# Patient Record
Sex: FEMALE | Race: WHITE | NOT HISPANIC OR LATINO | Employment: STUDENT | URBAN - METROPOLITAN AREA
[De-identification: names, ages, dates, MRNs, and addresses within clinical notes are randomized per-mention and may not be internally consistent; named-entity substitution may affect disease eponyms.]

---
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
## 2024-04-30 ENCOUNTER — OFFICE VISIT (OUTPATIENT)
Dept: URGENT CARE | Facility: CLINIC | Age: 19
End: 2024-04-30
Payer: COMMERCIAL

## 2024-04-30 VITALS
HEART RATE: 97 BPM | TEMPERATURE: 99.3 F | OXYGEN SATURATION: 98 % | DIASTOLIC BLOOD PRESSURE: 72 MMHG | SYSTOLIC BLOOD PRESSURE: 120 MMHG | RESPIRATION RATE: 18 BRPM

## 2024-04-30 DIAGNOSIS — J06.9 VIRAL URI WITH COUGH: Primary | ICD-10-CM

## 2024-04-30 PROCEDURE — 99213 OFFICE O/P EST LOW 20 MIN: CPT | Performed by: PHYSICIAN ASSISTANT

## 2024-04-30 NOTE — PATIENT INSTRUCTIONS
Discussed symptoms are most likely viral in nature.  Recommend continuing supportive care.  Work note provided.

## 2024-04-30 NOTE — PROGRESS NOTES
[facility]        NAME: Francy Fonseca is a 19 y.o. female  : 2005    MRN: [id number]  DATE: 2024  TIME: 4:54 PM    Assessment and Plan   Viral URI with cough [J06.9]  1. Viral URI with cough              Patient Instructions     Patient Instructions   Discussed symptoms are most likely viral in nature.  Recommend continuing supportive care.  Work note provided.      Follow up with PCP in 3-5 days.  Proceed to  ER if symptoms worsen.    Chief Complaint     Chief Complaint   Patient presents with    Cold Like Symptoms     Sore throat, congestion x 1 week along with a cough          History of Present Illness       Patient is a 18-year-old female presenting today with cold-like symptoms x 3 days.  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  Is needing a work note.  Denies fever, chills, chest tightness, SOB.        Review of Systems   Review of Systems   Constitutional:  Negative for chills, fatigue and fever.   HENT:  Positive for congestion, postnasal drip and sore throat.    Eyes:  Negative for redness and itching.   Respiratory:  Positive for cough. Negative for chest tightness and shortness of breath.    Cardiovascular:  Negative for chest pain.   Gastrointestinal:  Negative for diarrhea, nausea and vomiting.   Musculoskeletal:  Negative for arthralgias and myalgias.   Neurological:  Negative for light-headedness and headaches.         Current Medications       Current Outpatient Medications:     albuterol (PROVENTIL HFA,VENTOLIN HFA) 90 mcg/act inhaler, inhale 2 puffs every 4 hours if needed for wheezing or shortness of breath, Disp: 8.5 g, Rfl: 3    montelukast (SINGULAIR) 10 mg tablet, take 1 tablet by mouth daily at bedtime  (Patient not taking: Reported on 4/30/2024), Disp: 30 tablet, Rfl: 3    Xulane 150-35 MCG/24HR, Place 1 patch on the skin once a week (Patient not taking: Reported on 4/30/2024), Disp: , Rfl:     Current Allergies     Allergies as of 04/30/2024    (No Known Allergies)            The following portions of the patient's history were reviewed and updated as appropriate: allergies, current medications, past family history, past medical history, past social history, past surgical history and problem list.     History reviewed. No pertinent past medical history.    Past Surgical History:   Procedure Laterality Date    TONSILECTOMY AND ADNOIDECTOMY         Family History   Problem Relation Age of Onset    Hypertension Father     Hyperlipidemia Father          Medications have been verified.        Objective   /72   Pulse 97   Temp 99.3 °F (37.4 °C)   Resp 18   SpO2 98%        Physical Exam     Physical Exam  Vitals and nursing note reviewed.   Constitutional:       General: She is not in acute distress.     Appearance: Normal appearance. She is not ill-appearing.   HENT:      Head: Normocephalic.      Right Ear: Tympanic membrane, ear canal and external ear normal.      Left Ear: Tympanic membrane, ear canal and external ear normal.      Nose: Congestion present.      Mouth/Throat:      Mouth: Mucous membranes are moist.      Pharynx: Oropharynx is clear. No oropharyngeal exudate or posterior oropharyngeal erythema.   Eyes:      Conjunctiva/sclera: Conjunctivae normal.   Cardiovascular:      Rate and Rhythm: Normal rate and regular rhythm.      Pulses: Normal pulses.      Heart sounds: Normal heart sounds.   Pulmonary:      Effort: Pulmonary effort is normal.      Breath sounds: Normal breath sounds.   Lymphadenopathy:      Cervical: No cervical adenopathy.   Skin:     General: Skin is warm.      Capillary Refill: Capillary refill takes less than 2 seconds.   Neurological:      Mental Status: She is alert.

## 2024-04-30 NOTE — LETTER
April 30, 2024     Patient: Francy Fonseca   YOB: 2005   Date of Visit: 4/30/2024       To Whom It May Concern:    It is my medical opinion that Francy Fonseca may return to work on 05/03/2024 .    If you have any questions or concerns, please don't hesitate to call.         Sincerely,        Florentin Dumont PA-C    CC: No Recipients

## 2024-05-16 ENCOUNTER — TELEPHONE (OUTPATIENT)
Dept: FAMILY MEDICINE CLINIC | Facility: CLINIC | Age: 19
End: 2024-05-16

## 2024-05-28 ENCOUNTER — OFFICE VISIT (OUTPATIENT)
Dept: URGENT CARE | Facility: CLINIC | Age: 19
End: 2024-05-28
Payer: COMMERCIAL

## 2024-05-28 VITALS
RESPIRATION RATE: 18 BRPM | DIASTOLIC BLOOD PRESSURE: 68 MMHG | BODY MASS INDEX: 20.76 KG/M2 | WEIGHT: 124.6 LBS | OXYGEN SATURATION: 100 % | TEMPERATURE: 99.1 F | HEART RATE: 113 BPM | SYSTOLIC BLOOD PRESSURE: 110 MMHG | HEIGHT: 65 IN

## 2024-05-28 DIAGNOSIS — H66.003 NON-RECURRENT ACUTE SUPPURATIVE OTITIS MEDIA OF BOTH EARS WITHOUT SPONTANEOUS RUPTURE OF TYMPANIC MEMBRANES: Primary | ICD-10-CM

## 2024-05-28 DIAGNOSIS — J02.9 ACUTE VIRAL PHARYNGITIS: ICD-10-CM

## 2024-05-28 LAB — S PYO AG THROAT QL: NEGATIVE

## 2024-05-28 PROCEDURE — 87880 STREP A ASSAY W/OPTIC: CPT

## 2024-05-28 PROCEDURE — 99213 OFFICE O/P EST LOW 20 MIN: CPT

## 2024-05-28 RX ORDER — AMOXICILLIN 875 MG/1
875 TABLET, COATED ORAL 2 TIMES DAILY
Qty: 14 TABLET | Refills: 0 | Status: SHIPPED | OUTPATIENT
Start: 2024-05-28 | End: 2024-06-04

## 2024-05-28 NOTE — PATIENT INSTRUCTIONS
I have prescribed an antibiotic for the infection.  Please take the antibiotic as prescribed and finish the entire prescription.  Take an over the counter probiotic or eat yogurt with live cultures in it (activia) to keep good bacteria in the gut and help prevent diarrhea.      You may take over the counter Tylenol (Acetaminophen) and/or Motrin (Ibuprofen) as needed, as directed on packaging.      If your symptoms worsen or you develop drainage from the ear or worsening pain, please proceed to the emergency room.

## 2024-05-28 NOTE — PROGRESS NOTES
[facility] Now        NAME: Francy Fonseca is a 19 y.o. female  : 2005    MRN: [id number]  DATE: May 28, 2024  TIME: 6:09 PM    Assessment and Plan   Non-recurrent acute suppurative otitis media of both ears without spontaneous rupture of tympanic membranes [H66.003]  1. Non-recurrent acute suppurative otitis media of both ears without spontaneous rupture of tympanic membranes  amoxicillin (AMOXIL) 875 mg tablet      2. Acute viral pharyngitis  POCT rapid ANTIGEN strepA            Patient Instructions     I have prescribed an antibiotic for the infection.  Please take the antibiotic as prescribed and finish the entire prescription.  Take an over the counter probiotic or eat yogurt with live cultures in it (activia) to keep good bacteria in the gut and help prevent diarrhea.      You may take over the counter Tylenol (Acetaminophen) and/or Motrin (Ibuprofen) as needed, as directed on packaging.      If your symptoms worsen or you develop drainage from the ear or worsening pain, please proceed to the emergency room.    Follow up with PCP in 3-5 days.  Proceed to  ER if symptoms worsen.    If tests are performed, our office will contact you with results only if changes need to made to the care plan discussed with you at the visit. You can review your full results on [facility].    Chief Complaint     Chief Complaint   Patient presents with    Sore Throat     Patient c/o sore throat and ear pain that started 2 days ago.          History of Present Illness       Sore throat and ear pain that began 2 days ago. Pt using OTC ibuprofen and tylenol and states it is not helping her symptoms. Denies fever or chills. + fatigue. Denies appetite change. Reports voice hoarse. + productive cough for clear sputum.         Review of Systems   Review of Systems   Constitutional:  Positive for fatigue. Negative for activity change, appetite change, chills and fever.   HENT:  Positive for ear pain and sore throat.  "Negative for congestion, ear discharge, postnasal drip, rhinorrhea, sinus pressure, sinus pain, trouble swallowing and voice change.    Eyes:  Negative for pain, discharge, redness and itching.   Respiratory:  Positive for cough (productive; clear). Negative for shortness of breath.    Gastrointestinal:  Negative for abdominal pain, diarrhea, nausea and vomiting.   Musculoskeletal:  Negative for arthralgias, myalgias, neck pain and neck stiffness.   Skin:  Negative for color change.   Neurological:  Negative for headaches.         Current Medications       Current Outpatient Medications:     amoxicillin (AMOXIL) 875 mg tablet, Take 1 tablet (875 mg total) by mouth 2 (two) times a day for 7 days, Disp: 14 tablet, Rfl: 0    albuterol (PROVENTIL HFA,VENTOLIN HFA) 90 mcg/act inhaler, inhale 2 puffs every 4 hours if needed for wheezing or shortness of breath (Patient not taking: Reported on 5/28/2024), Disp: 8.5 g, Rfl: 3    montelukast (SINGULAIR) 10 mg tablet, take 1 tablet by mouth daily at bedtime (Patient not taking: Reported on 4/30/2024), Disp: 30 tablet, Rfl: 3    Xulane 150-35 MCG/24HR, Place 1 patch on the skin once a week (Patient not taking: Reported on 4/30/2024), Disp: , Rfl:     Current Allergies     Allergies as of 05/28/2024    (No Known Allergies)            The following portions of the patient's history were reviewed and updated as appropriate: allergies, current medications, past family history, past medical history, past social history, past surgical history and problem list.     History reviewed. No pertinent past medical history.    Past Surgical History:   Procedure Laterality Date    TONSILECTOMY AND ADNOIDECTOMY         Family History   Problem Relation Age of Onset    Hypertension Father     Hyperlipidemia Father          Medications have been verified.        Objective   /68   Pulse (!) 113   Temp 99.1 °F (37.3 °C) (Temporal)   Resp 18   Ht 5' 5\" (1.651 m)   Wt 56.5 kg (124 lb 9.6 " oz)   LMP 05/23/2024 (Exact Date)   SpO2 100%   BMI 20.73 kg/m²        Physical Exam     Physical Exam  Vitals and nursing note reviewed.   Constitutional:       General: She is not in acute distress.     Appearance: She is well-developed and normal weight. She is not ill-appearing or toxic-appearing.   HENT:      Head: Normocephalic and atraumatic.      Right Ear: Hearing, ear canal and external ear normal. No drainage. A middle ear effusion is present. No mastoid tenderness. Tympanic membrane is injected, erythematous and bulging.      Left Ear: Hearing, ear canal and external ear normal. No drainage. A middle ear effusion is present. No mastoid tenderness. Tympanic membrane is erythematous.      Mouth/Throat:      Mouth: Mucous membranes are moist. No oral lesions.      Pharynx: Uvula midline. Posterior oropharyngeal erythema present. No oropharyngeal exudate or uvula swelling.      Comments: Tonsils surgically absent  Eyes:      Conjunctiva/sclera: Conjunctivae normal.      Pupils: Pupils are equal, round, and reactive to light.   Cardiovascular:      Rate and Rhythm: Regular rhythm. Tachycardia present.      Heart sounds: Normal heart sounds.   Pulmonary:      Effort: Pulmonary effort is normal.      Breath sounds: Normal breath sounds.   Musculoskeletal:      Cervical back: Normal range of motion and neck supple.   Skin:     General: Skin is warm and dry.      Capillary Refill: Capillary refill takes less than 2 seconds.   Neurological:      Mental Status: She is alert.

## 2024-05-28 NOTE — LETTER
May 28, 2024     Patient: Francy Fonseca  YOB: 2005  Date of Visit: 5/28/2024      To Whom it May Concern:    Francy Fonseca is under my professional care. Francy was seen in my office on 5/28/2024. Francy may return to work on 5/30/24 .    If you have any questions or concerns, please don't hesitate to call.         Sincerely,          TESHA Cartagena        CC: No Recipients

## 2025-03-30 ENCOUNTER — APPOINTMENT (EMERGENCY)
Dept: RADIOLOGY | Facility: HOSPITAL | Age: 20
End: 2025-03-30
Payer: COMMERCIAL

## 2025-03-30 ENCOUNTER — HOSPITAL ENCOUNTER (EMERGENCY)
Facility: HOSPITAL | Age: 20
Discharge: HOME | End: 2025-03-30
Payer: COMMERCIAL

## 2025-03-30 VITALS
SYSTOLIC BLOOD PRESSURE: 122 MMHG | RESPIRATION RATE: 19 BRPM | DIASTOLIC BLOOD PRESSURE: 78 MMHG | HEART RATE: 98 BPM | TEMPERATURE: 97.9 F | OXYGEN SATURATION: 98 %
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
## 2025-03-30 PROCEDURE — 99283 EMERGENCY DEPT VISIT LOW MDM: CPT

## 2025-03-30 PROCEDURE — 90715 TDAP VACCINE 7 YRS/> IM: CPT | Performed by: PHYSICIAN ASSISTANT

## 2025-03-30 PROCEDURE — 73130 X-RAY EXAM OF HAND: CPT

## 2025-03-30 PROCEDURE — 99284 EMERGENCY DEPT VISIT MOD MDM: CPT | Performed by: PHYSICIAN ASSISTANT

## 2025-03-30 PROCEDURE — 90471 IMMUNIZATION ADMIN: CPT

## 2025-03-30 RX ADMIN — AMOXICILLIN AND CLAVULANATE POTASSIUM 1 TABLET: 875; 125 TABLET, FILM COATED ORAL at 20:19

## 2025-03-30 RX ADMIN — TETANUS TOXOID, REDUCED DIPHTHERIA TOXOID AND ACELLULAR PERTUSSIS VACCINE, ADSORBED 0.5 ML: 5; 2.5; 8; 8; 2.5 SUSPENSION INTRAMUSCULAR at 20:19

## 2025-03-31 ENCOUNTER — TELEPHONE (OUTPATIENT)
Dept: FAMILY MEDICINE CLINIC | Facility: CLINIC | Age: 20
End: 2025-03-31

## 2025-03-31 NOTE — ED PROVIDER NOTES
Time reflects when diagnosis was documented in both MDM as applicable and the Disposition within this note       Time User Action Codes Description Comment    3/30/2025  8:33 PM Salazar Roldan Add [W54.0XXA] Dog bite, initial encounter           ED Disposition       ED Disposition   Discharge    Condition   Stable    Date/Time   Sun Mar 30, 2025  8:33 PM    Comment   Francy SHON Anger discharge to home/self care.                   Assessment & Plan       Medical Decision Making  20-year-old female here for evaluation of animal bite.  See HPI for further details.  Differential diagnosis includes wound infection, laceration, puncture wound, metacarpal fracture, abscess, rabies.    Animal is up-to-date on rabies, patient was covered with Augmentin for infection x-ray negative for fracture or foreign body, wound was cared for stable for discharge home on Augmentin.    Amount and/or Complexity of Data Reviewed  Radiology: ordered and independent interpretation performed.    Risk  Prescription drug management.        ED Course as of 03/30/25 2035   Sun Mar 30, 2025   2013 SpO2: 98 %   2013 Respirations: 19   2013 Pulse: 98   2013 Temperature: 97.9 °F (36.6 °C)   2013 Blood Pressure: 122/78  Vs reviewed wnl       Medications   amoxicillin-clavulanate (AUGMENTIN) 875-125 mg per tablet 1 tablet (1 tablet Oral Given 3/30/25 2019)   tetanus-diphtheria-acellular pertussis (BOOSTRIX) IM injection 0.5 mL (0.5 mL Intramuscular Given 3/30/25 2019)       ED Risk Strat Scores                            SBIRT 20yo+      Flowsheet Row Most Recent Value   Initial Alcohol Screen: US AUDIT-C     1. How often do you have a drink containing alcohol? 0 Filed at: 03/30/2025 2006   2. How many drinks containing alcohol do you have on a typical day you are drinking?  0 Filed at: 03/30/2025 2006   3a. Male UNDER 65: How often do you have five or more drinks on one occasion? 0 Filed at: 03/30/2025 2006   3b. FEMALE Any Age, or MALE 65+: How often  do you have 4 or more drinks on one occassion? 0 Filed at: 03/30/2025 2006   Audit-C Score 0 Filed at: 03/30/2025 2006   ROSA: How many times in the past year have you...    Used an illegal drug or used a prescription medication for non-medical reasons? Never Filed at: 03/30/2025 2006                            History of Present Illness       Chief Complaint   Patient presents with    Animal Bite     Family beagle bite onto right hand. UTD on vaccinations       History reviewed. No pertinent past medical history.   Past Surgical History:   Procedure Laterality Date    TONSILECTOMY AND ADNOIDECTOMY        Family History   Problem Relation Age of Onset    Hypertension Father     Hyperlipidemia Father       Social History     Tobacco Use    Smoking status: Never    Smokeless tobacco: Never   Vaping Use    Vaping status: Never Used   Substance Use Topics    Alcohol use: Never    Drug use: Never      E-Cigarette/Vaping    E-Cigarette Use Never User       E-Cigarette/Vaping Substances      I have reviewed and agree with the history as documented.     This is a pleasant 20-year-old female presenting to the emergency department today for evaluation of an animal bite.  She states she was playing with her dog at home when the dog bit her on the right hand.  Bleeding was controlled with direct pressure she admits to minimal pain in the region.  She states the animal is up-to-date on standard vaccines.  Patient is unsure of her last tetanus status.  Denies range of motion deficits.          Review of Systems   Constitutional:  Negative for chills and fever.   HENT:  Negative for ear pain and sore throat.    Eyes:  Negative for pain and visual disturbance.   Respiratory:  Negative for cough and shortness of breath.    Cardiovascular:  Negative for chest pain and palpitations.   Gastrointestinal:  Negative for abdominal pain and vomiting.   Genitourinary:  Negative for dysuria and hematuria.   Musculoskeletal:  Negative for  arthralgias and back pain.        Right hand pain   Skin:  Positive for wound. Negative for color change and rash.   Neurological:  Negative for seizures and syncope.   All other systems reviewed and are negative.          Objective       ED Triage Vitals [03/30/25 2008]   Temperature Pulse Blood Pressure Respirations SpO2 Patient Position - Orthostatic VS   97.9 °F (36.6 °C) 98 122/78 19 98 % --      Temp src Heart Rate Source BP Location FiO2 (%) Pain Score    -- Monitor -- -- --      Vitals      Date and Time Temp Pulse SpO2 Resp BP Pain Score FACES Pain Rating User   03/30/25 2008 97.9 °F (36.6 °C) 98 98 % 19 122/78 -- -- CP            Physical Exam  Vitals reviewed.   Constitutional:       General: She is not in acute distress.     Appearance: Normal appearance. She is not ill-appearing, toxic-appearing or diaphoretic.   HENT:      Head: Normocephalic and atraumatic.      Right Ear: External ear normal.      Left Ear: External ear normal.   Eyes:      General: No scleral icterus.        Right eye: No discharge.         Left eye: No discharge.      Extraocular Movements: Extraocular movements intact.      Conjunctiva/sclera: Conjunctivae normal.   Cardiovascular:      Rate and Rhythm: Normal rate.      Pulses: Normal pulses.   Pulmonary:      Effort: Pulmonary effort is normal. No respiratory distress.      Breath sounds: No stridor.   Musculoskeletal:         General: Tenderness and signs of injury present. No deformity.      Cervical back: Normal range of motion. No rigidity.   Skin:     General: Skin is warm.      Coloration: Skin is not jaundiced.      Findings: No lesion or rash.      Comments: Sub cm wound linear dorsum near 2nd metacarpal bone right hand. NO active bleeding.    Neurological:      General: No focal deficit present.      Mental Status: She is alert and oriented to person, place, and time. Mental status is at baseline.      Gait: Gait normal.   Psychiatric:         Mood and Affect: Mood  normal.         Thought Content: Thought content normal.         Judgment: Judgment normal.         Results Reviewed       None            XR hand 3+ views RIGHT   ED Interpretation by Salazar Roldan PA-C (03/30 2031)   Personally interpreted there is no evidence of acute osseous abnormalities over the region of concern which is between the first and second digits.  There does appear to be a focus of gas associated with the known soft tissue injury I do not have concern for underlying infection being that injury only happened within 30 minutes of the x-ray being taken.  No evidence of any radiopaque foreign bodies.          Procedures  Wound was cleansed with Betadine swab extensively x 2.  Irrigated.  A single half-inch Steri-Strip was placed over the wound followed by nonstick dressing followed by Eliana wrap followed by Ace wrap.  Patient tolerated procedure well.  ED Medication and Procedure Management   Prior to Admission Medications   Prescriptions Last Dose Informant Patient Reported? Taking?   Xulane 150-35 MCG/24HR   Yes No   Sig: Place 1 patch on the skin once a week   Patient not taking: Reported on 4/30/2024   albuterol (PROVENTIL HFA,VENTOLIN HFA) 90 mcg/act inhaler   No No   Sig: inhale 2 puffs every 4 hours if needed for wheezing or shortness of breath   Patient not taking: Reported on 5/28/2024   montelukast (SINGULAIR) 10 mg tablet   No No   Sig: take 1 tablet by mouth daily at bedtime   Patient not taking: Reported on 4/30/2024      Facility-Administered Medications: None     Patient's Medications   Discharge Prescriptions    AMOXICILLIN-CLAVULANATE (AUGMENTIN) 875-125 MG PER TABLET    Take 1 tablet by mouth every 12 (twelve) hours for 7 days       Start Date: 3/30/2025 End Date: 4/6/2025       Order Dose: 1 tablet       Quantity: 14 tablet    Refills: 0     No discharge procedures on file.  ED SEPSIS DOCUMENTATION   Time reflects when diagnosis was documented in both MDM as applicable and  the Disposition within this note       Time User Action Codes Description Comment    3/30/2025  8:33 PM Salazar Roldan Add [W54.0XXA] Dog bite, initial encounter                  Salazar Roldan PA-C  03/30/25 2035

## 2025-03-31 NOTE — TELEPHONE ENCOUNTER
TESHA [facility] Clinical  Patient has not been seen since 3/8/2022, please remove from my panel OR schedule an annual but she is technically not my patient at this point, let me know plan thanks

## 2025-03-31 NOTE — TELEPHONE ENCOUNTER
03/31/25 2:13 PM    Hello    The office’s request has been received. If a patient has not been seen in within last 3 years, 3 phone calls and a certified letter (must be letter listed in workflow) are to be sent. Once that workflow is completed, please resend PCP removal request with date letter was sent. PCP will be removed 30 days after certified letter sent (if patient doesn’t respond/scheduled with office).      Thank you  Yasmine Shen

## 2025-05-02 ENCOUNTER — APPOINTMENT (EMERGENCY)
Dept: RADIOLOGY | Facility: HOSPITAL | Age: 20
End: 2025-05-02
Payer: COMMERCIAL

## 2025-05-02 ENCOUNTER — HOSPITAL ENCOUNTER (EMERGENCY)
Facility: HOSPITAL | Age: 20
Discharge: HOME | End: 2025-05-02
Attending: EMERGENCY MEDICINE
Payer: COMMERCIAL

## 2025-05-02 VITALS
OXYGEN SATURATION: 99 % | RESPIRATION RATE: 18 BRPM | TEMPERATURE: 98.9 F | HEART RATE: 77 BPM | DIASTOLIC BLOOD PRESSURE: 74 MMHG | SYSTOLIC BLOOD PRESSURE: 131 MMHG

## 2025-05-02 DIAGNOSIS — S60.229A CONTUSION OF HAND: Primary | ICD-10-CM

## 2025-05-02 PROCEDURE — 99284 EMERGENCY DEPT VISIT MOD MDM: CPT | Performed by: PHYSICIAN ASSISTANT

## 2025-05-02 PROCEDURE — 99283 EMERGENCY DEPT VISIT LOW MDM: CPT

## 2025-05-02 PROCEDURE — 73130 X-RAY EXAM OF HAND: CPT

## 2025-05-02 NOTE — ED PROVIDER NOTES
Time reflects when diagnosis was documented in both MDM as applicable and the Disposition within this note       Time User Action Codes Description Comment    5/2/2025 12:13 PM Salazar Roldan Add [S60.229A] Contusion of hand           ED Disposition       ED Disposition   Discharge    Condition   Stable    Date/Time   Fri May 2, 2025 12:13 PM    Comment   Francy SHON Anger discharge to home/self care.                   Assessment & Plan       Medical Decision Making  20-year-old female presents for evaluation of pain in the right fifth metacarpal region.  She presents amatory via private vehicle no acute distress stating she was practicing punching utilizing a punching bag last evening and now has pain and swelling in that region.  Denies sensation deficits no other injuries.  Differential diagnosis includes metacarpal fracture dislocation    Carpal fracture phalanx fracture.    No fractures on x-ray Ace wrap placed personally recommend rest ice compression elevation    Amount and/or Complexity of Data Reviewed  Radiology: ordered and independent interpretation performed.        ED Course as of 05/02/25 1214   Fri May 02, 2025   1140 SpO2: 99 %   1140 Respirations: 18   1140 Pulse: 77   1140 Temperature: 98.9 °F (37.2 °C)   1140 Blood Pressure: 131/74  Vital signs reviewed within normal limits       Medications - No data to display    ED Risk Strat Scores                    No data recorded        SBIRT 20yo+      Flowsheet Row Most Recent Value   Initial Alcohol Screen: US AUDIT-C     1. How often do you have a drink containing alcohol? 0 Filed at: 05/02/2025 1128   2. How many drinks containing alcohol do you have on a typical day you are drinking?  0 Filed at: 05/02/2025 1128   3b. FEMALE Any Age, or MALE 65+: How often do you have 4 or more drinks on one occassion? 0 Filed at: 05/02/2025 1128   Audit-C Score 0 Filed at: 05/02/2025 1128   ROSA: How many times in the past year have you...    Used an illegal drug  or used a prescription medication for non-medical reasons? Never Filed at: 05/02/2025 1128                            History of Present Illness       Chief Complaint   Patient presents with    Hand Injury     Patient reports that she was hitting punching bag last night and missed and hit the wall. Pain to right hand       History reviewed. No pertinent past medical history.   Past Surgical History:   Procedure Laterality Date    TONSILECTOMY AND ADNOIDECTOMY        Family History   Problem Relation Age of Onset    Hypertension Father     Hyperlipidemia Father       Social History     Tobacco Use    Smoking status: Never    Smokeless tobacco: Never   Vaping Use    Vaping status: Never Used   Substance Use Topics    Alcohol use: Never    Drug use: Never      E-Cigarette/Vaping    E-Cigarette Use Never User       E-Cigarette/Vaping Substances      I have reviewed and agree with the history as documented.     This is a 20-year-old female presenting to the emergency department today for evaluation of pain in the right fifth metacarpal region.  She states last evening she was practicing punching utilizing a punching bag and now has pain over the right fifth metacarpal region.      Hand Injury  Associated symptoms: no back pain and no fever        Review of Systems   Constitutional:  Negative for chills and fever.   HENT:  Negative for ear pain and sore throat.    Eyes:  Negative for pain and visual disturbance.   Respiratory:  Negative for cough and shortness of breath.    Cardiovascular:  Negative for chest pain and palpitations.   Gastrointestinal:  Negative for abdominal pain and vomiting.   Genitourinary:  Negative for dysuria and hematuria.   Musculoskeletal:  Negative for arthralgias and back pain.        Right hand pain   Skin:  Negative for color change and rash.   Neurological:  Negative for seizures and syncope.   All other systems reviewed and are negative.          Objective       ED Triage Vitals [05/02/25  1126]   Temperature Pulse Blood Pressure Respirations SpO2 Patient Position - Orthostatic VS   98.9 °F (37.2 °C) 77 131/74 18 99 % Sitting      Temp Source Heart Rate Source BP Location FiO2 (%) Pain Score    Temporal Monitor Right arm -- 7      Vitals      Date and Time Temp Pulse SpO2 Resp BP Pain Score FACES Pain Rating User   05/02/25 1126 98.9 °F (37.2 °C) 77 99 % 18 131/74 7 -- TF            Physical Exam  Vitals reviewed.   Constitutional:       General: She is not in acute distress.     Appearance: Normal appearance. She is not ill-appearing, toxic-appearing or diaphoretic.   HENT:      Head: Normocephalic and atraumatic.      Right Ear: External ear normal.      Left Ear: External ear normal.   Eyes:      General: No scleral icterus.        Right eye: No discharge.         Left eye: No discharge.      Extraocular Movements: Extraocular movements intact.      Conjunctiva/sclera: Conjunctivae normal.   Cardiovascular:      Rate and Rhythm: Normal rate.      Pulses: Normal pulses.   Pulmonary:      Effort: Pulmonary effort is normal. No respiratory distress.      Breath sounds: No stridor.   Musculoskeletal:         General: Swelling, tenderness and signs of injury present. No deformity.      Cervical back: Normal range of motion. No rigidity.      Comments: Tenderness over the right fifth metacarpal region   Skin:     General: Skin is warm.      Coloration: Skin is not jaundiced.      Findings: No lesion or rash.   Neurological:      General: No focal deficit present.      Mental Status: She is alert and oriented to person, place, and time. Mental status is at baseline.      Gait: Gait normal.   Psychiatric:         Mood and Affect: Mood normal.         Thought Content: Thought content normal.         Judgment: Judgment normal.         Results Reviewed       None            XR hand 3+ views RIGHT   ED Interpretation by Salazar Roldan PA-C (05/02 1213)   No evidence of fracture or dislocation           Procedures    ED Medication and Procedure Management   Prior to Admission Medications   Prescriptions Last Dose Informant Patient Reported? Taking?   Xulane 150-35 MCG/24HR   Yes No   Sig: Place 1 patch on the skin once a week   Patient not taking: Reported on 4/30/2024   albuterol (PROVENTIL HFA,VENTOLIN HFA) 90 mcg/act inhaler   No No   Sig: inhale 2 puffs every 4 hours if needed for wheezing or shortness of breath   Patient not taking: Reported on 5/28/2024   montelukast (SINGULAIR) 10 mg tablet   No No   Sig: take 1 tablet by mouth daily at bedtime   Patient not taking: Reported on 4/30/2024      Facility-Administered Medications: None     Patient's Medications   Discharge Prescriptions    No medications on file     No discharge procedures on file.  ED SEPSIS DOCUMENTATION   Time reflects when diagnosis was documented in both MDM as applicable and the Disposition within this note       Time User Action Codes Description Comment    5/2/2025 12:13 PM Salazar Roldan Add [S60.229A] Contusion of hand                  Slaazar Roldan PA-C  05/02/25 1214